# Patient Record
Sex: MALE | Race: WHITE
[De-identification: names, ages, dates, MRNs, and addresses within clinical notes are randomized per-mention and may not be internally consistent; named-entity substitution may affect disease eponyms.]

---

## 2021-12-17 ENCOUNTER — HOSPITAL ENCOUNTER (EMERGENCY)
Dept: HOSPITAL 75 - ER FS | Age: 46
Discharge: HOME | End: 2021-12-17
Payer: COMMERCIAL

## 2021-12-17 VITALS — SYSTOLIC BLOOD PRESSURE: 151 MMHG | DIASTOLIC BLOOD PRESSURE: 97 MMHG

## 2021-12-17 VITALS — HEIGHT: 70.98 IN | BODY MASS INDEX: 28.15 KG/M2 | WEIGHT: 201.06 LBS

## 2021-12-17 DIAGNOSIS — R42: Primary | ICD-10-CM

## 2021-12-17 DIAGNOSIS — F17.210: ICD-10-CM

## 2021-12-17 PROCEDURE — 82947 ASSAY GLUCOSE BLOOD QUANT: CPT

## 2021-12-17 NOTE — ED GENERAL
General


Chief Complaint:  General Problems/Pain


Stated Complaint:  DIZZY


Nursing Triage Note:  


Patient states that he was diagnosed with a right ear infection on 12/11.  


Patient was given prednison with followup with an ENT.  Patient has not had that




appointment yet.  Patient states that he began getting dizzy this afternoon.  


Patient states that he felt like he was going to pass out at times.  Patient 


wasn't given antibiotics for the ear infection, just prednisone and flonase.  


Patient wanted to get checked out.  Patient did state that he hasn't eaten 


today.


Source of Information:  Patient





History of Present Illness


Date Seen by Provider:  Dec 17, 2021


Time Seen by Provider:  20:09


Initial Comments


46-year-old male presenting with concern for continued dizziness over the last 4

to 5 weeks.  He states that it seems to be worse when he is turning his head 

quickly stands up.  At times he feels like he might pass out.  He felt like he 

might pass out this afternoon when he was standing up by the game.  He had been 

seen by the urgent care and they put him on steroids for an ear infection.  He 

went back and the have him on a nasal steroid spray as well as started him on 

meclizine.  He was to follow-up with Dr. Chacon with ENT but the first available 

appointment is not until March.  Since he was feeling bad this evening he had 

his girlfriend drive him here to the ED.  He is feeling anxious and jittery as 

well.  He states he drinks a lot of coffee and caffeine drinks.


Associated Systoms:  No Cough, No Diaphoresis, No Fever/Chills, No Headaches, No

Loss of Appetite, No Malaise, No Nausea/Vomiting, No Rash, No Seizure, No 

Shortness of Air, No Syncope, No Weakness





Allergies and Home Medications


Allergies


Coded Allergies:  


     No Known Drug Allergies (Unverified , 12/17/21)





Patient Home Medication List


Home Medication List Reviewed:  Yes





Review of Systems


Review of Systems


Constitutional:  see HPI


EENTM:  No ear discharge, No hearing loss, No ear pain, No blurred vision, No 

epistaxis, No nose congestion


Respiratory:  No cough


Cardiovascular:  chest pain (Tightness in his chest tonight as he feels anxious)


Gastrointestinal:  no symptoms reported


Genitourinary:  no symptoms reported


Musculoskeletal:  no symptoms reported


Skin:  no symptoms reported


Psychiatric/Neurological:  Anxiety; Denies Headache, Denies Numbness, Denies 

Paresthesia, Denies Tingling, Denies Tremors





Past Medical-Social-Family Hx


Patient Social History


Tobacco Use?:  Yes


Tobacco type used:  Cigarettes


Smoking Status:  Current Everyday Smoker


Substance use?:  No


Alcohol Use?:  No


Pt feels they are or have been:  No





Past Medical History


Surgery/Hospitalization HX:  


Ear Infection





Physical Exam


Vital Signs





Vital Signs - First Documented








 12/17/21





 20:11


 


Temp 36.3


 


Pulse 78


 


Resp 18


 


B/P (MAP) 151/97 (115)


 


Pulse Ox 96


 


O2 Delivery Room Air





Capillary Refill : Less Than 3 Seconds


Height, Weight, BMI


Height: '"


Weight: lbs. oz. kg; 28.00 BMI


Method:


General Appearance:  WD/WN, Anxious


HEENT:  PERRL/EOMI, TMs Normal, Normal ENT Inspection, Pharynx Normal


Neck:  Full Range of Motion, Normal Inspection, Non Tender, Supple


Respiratory:  Chest Non Tender, Lungs Clear, Normal Breath Sounds, No Accessory 

Muscle Use, No Respiratory Distress


Cardiovascular:  Regular Rate, Rhythm, No JVD, No Murmur, Normal Peripheral 

Pulses


Extremity:  Normal Capillary Refill, No Pedal Edema


Neurologic/Psychiatric:  Alert, Oriented x3, CNs II-XII Norm as Tested


Skin:  Warm/Dry





Progress/Results/Core Measures


Suspected Sepsis


SIRS


Temperature: 


Pulse: 78 


Respiratory Rate: 18


 


Blood Pressure 151 /97 


Mean: 115





Results/Orders


Lab Results





Laboratory Tests








Test


 12/17/21


20:16 Range/Units


 


 


Glucometer 80    MG/DL








Vital Signs/I&O











 12/17/21





 20:11


 


Temp 36.3


 


Pulse 78


 


Resp 18


 


B/P (MAP) 151/97 (115)


 


Pulse Ox 96


 


O2 Delivery Room Air





Capillary Refill : Less Than 3 Seconds








Blood Pressure Mean:                    115











Point of Care Testing


Finger Stick Blood Glucose:  80


Blood Glucose Action Taken:  Dr. Junior Notified


Progress Note :  


Progress Note


Accu-Chek was eighty.  Advised patient that his blood pressure did not appear 

high enough to be causing his dizziness.  I counseled him about having blood 

work to look for anemia as well as electrolyte imbalance or kidney or liver 

issues.  He could have an EKG to look for heart arrhythmias.  CT scan of the 

head to look for sinus infection, mass, tumor, stroke that might be causing his 

symptoms.


Patient refused and stated he did not feel those were necessary.  He will follow

up with the clinic and continue to work on trying to get in with ENT.  Counseled

he can return if having worsening symptoms or more concerns.





Departure


Impression





   Primary Impression:  


   Dizziness


Disposition:  01 HOME, SELF-CARE


Condition:  Stable





Departure-Patient Inst.


Decision time for Depature:  20:30


Referrals:  


CARMENCITA BHATIA MD





NO,LOCAL PHYSICIAN (PCP)


Primary Care Physician








[facility] – [place], [place]


Patient Instructions:  Dizziness, Adult ED, Vertigo ED





Add. Discharge Instructions:  


Try drinking more water and electrolyte drinks instead of just coffee and see if

that helps your symptoms.





You could continue taking the Meclizine to give it a chance and see if it helps 

with your dizziness. 





Establish care with local provider and work with them about your dizziness. You 

could call [facility] clinic at [phone] Monday morning and get set up with an 

appointment for establishing care. 





If you have worsening symptoms or more concerns before you are seen you could 

return to ER or seek medical care.





All discharge instructions reviewed with patient and/or family. Voiced 

understanding.











MEDARDO JUNIOR MD               Dec 17, 2021 20:32

## 2023-06-21 ENCOUNTER — HOSPITAL ENCOUNTER (EMERGENCY)
Dept: HOSPITAL 75 - ER FS | Age: 48
Discharge: HOME | End: 2023-06-21
Payer: COMMERCIAL

## 2023-06-21 VITALS — DIASTOLIC BLOOD PRESSURE: 70 MMHG | SYSTOLIC BLOOD PRESSURE: 134 MMHG

## 2023-06-21 VITALS — BODY MASS INDEX: 26.05 KG/M2 | WEIGHT: 186.07 LBS | HEIGHT: 70.87 IN

## 2023-06-21 DIAGNOSIS — F17.210: ICD-10-CM

## 2023-06-21 DIAGNOSIS — Z28.310: ICD-10-CM

## 2023-06-21 DIAGNOSIS — R07.89: Primary | ICD-10-CM

## 2023-06-21 DIAGNOSIS — T43.615A: ICD-10-CM

## 2023-06-21 LAB
ALBUMIN SERPL-MCNC: 4.5 GM/DL (ref 3.2–4.5)
ALP SERPL-CCNC: 134 U/L (ref 40–136)
ALT SERPL-CCNC: 46 U/L (ref 0–55)
APTT BLD: 26 SEC (ref 24–35)
BARBITURATES UR QL: NEGATIVE
BASOPHILS # BLD AUTO: 0.1 10^3/UL (ref 0–0.1)
BASOPHILS NFR BLD AUTO: 1 % (ref 0–10)
BENZODIAZ UR QL SCN: NEGATIVE
BILIRUB SERPL-MCNC: 0.2 MG/DL (ref 0.1–1)
BUN/CREAT SERPL: 8
CALCIUM SERPL-MCNC: 9.3 MG/DL (ref 8.5–10.1)
CHLORIDE SERPL-SCNC: 101 MMOL/L (ref 98–107)
CO2 SERPL-SCNC: 24 MMOL/L (ref 21–32)
COCAINE UR QL: NEGATIVE
CREAT SERPL-MCNC: 1.09 MG/DL (ref 0.6–1.3)
EOSINOPHIL # BLD AUTO: 0.2 10^3/UL (ref 0–0.3)
EOSINOPHIL NFR BLD AUTO: 2 % (ref 0–10)
GFR SERPLBLD BASED ON 1.73 SQ M-ARVRAT: 85 ML/MIN
GLUCOSE SERPL-MCNC: 97 MG/DL (ref 70–105)
HCT VFR BLD CALC: 45 % (ref 40–54)
HGB BLD-MCNC: 15.2 G/DL (ref 13.3–17.7)
INR PPP: 0.9 (ref 0.8–1.4)
LYMPHOCYTES # BLD AUTO: 2.9 10^3/UL (ref 1–4)
LYMPHOCYTES NFR BLD AUTO: 29 % (ref 12–44)
MAGNESIUM SERPL-MCNC: 1.9 MG/DL (ref 1.6–2.4)
MANUAL DIFFERENTIAL PERFORMED BLD QL: NO
MCH RBC QN AUTO: 31 PG (ref 25–34)
MCHC RBC AUTO-ENTMCNC: 34 G/DL (ref 32–36)
MCV RBC AUTO: 92 FL (ref 80–99)
METHADONE UR QL SCN: NEGATIVE
MONOCYTES # BLD AUTO: 0.8 10^3/UL (ref 0–1)
MONOCYTES NFR BLD AUTO: 8 % (ref 0–12)
NEUTROPHILS # BLD AUTO: 5.9 10^3/UL (ref 1.8–7.8)
NEUTROPHILS NFR BLD AUTO: 60 % (ref 42–75)
OPIATES UR QL SCN: NEGATIVE
OXYCODONE UR QL: NEGATIVE
PLATELET # BLD: 318 10^3/UL (ref 130–400)
PMV BLD AUTO: 9.8 FL (ref 9–12.2)
POTASSIUM SERPL-SCNC: 4.4 MMOL/L (ref 3.6–5)
PROPOXYPH UR QL: NEGATIVE
PROT SERPL-MCNC: 7.3 GM/DL (ref 6.4–8.2)
PROTHROMBIN TIME: 12.7 SEC (ref 12.2–14.7)
SODIUM SERPL-SCNC: 139 MMOL/L (ref 135–145)
TRICYCLICS UR QL SCN: NEGATIVE
WBC # BLD AUTO: 9.9 10^3/UL (ref 4.3–11)

## 2023-06-21 PROCEDURE — 83735 ASSAY OF MAGNESIUM: CPT

## 2023-06-21 PROCEDURE — 85610 PROTHROMBIN TIME: CPT

## 2023-06-21 PROCEDURE — 36415 COLL VENOUS BLD VENIPUNCTURE: CPT

## 2023-06-21 PROCEDURE — 84484 ASSAY OF TROPONIN QUANT: CPT

## 2023-06-21 PROCEDURE — 93041 RHYTHM ECG TRACING: CPT

## 2023-06-21 PROCEDURE — 85025 COMPLETE CBC W/AUTO DIFF WBC: CPT

## 2023-06-21 PROCEDURE — 83880 ASSAY OF NATRIURETIC PEPTIDE: CPT

## 2023-06-21 PROCEDURE — 71045 X-RAY EXAM CHEST 1 VIEW: CPT

## 2023-06-21 PROCEDURE — 80053 COMPREHEN METABOLIC PANEL: CPT

## 2023-06-21 PROCEDURE — 93005 ELECTROCARDIOGRAM TRACING: CPT

## 2023-06-21 PROCEDURE — 80306 DRUG TEST PRSMV INSTRMNT: CPT

## 2023-06-21 PROCEDURE — 85730 THROMBOPLASTIN TIME PARTIAL: CPT

## 2023-06-21 NOTE — ED CHEST PAIN
General


Stated Complaint:  CHEST PAIN





History of Present Illness


Date Seen by Provider:  2023


Time Seen by Provider:  14:09


Initial Comments


47-year-old male with no significant PMH, is here with complaints of left-sided 

chest pain for the past 3 days that comes and goes, and today morning the pain 

seems to be lasting longer.  Chest pain is none radiating, and rated as 2/ 10 at

rest and 6/10 upon movement.  Patient denies fever and chills, shortness of 

breath, palpitations, diaphoresis, abdominal pain, nausea and vomiting, heavy 

lifting, injuries.  Patient does not have any cardiac history.  Patient smokes 1

pack/day for the past 30 years.  Patient states that he drinks 20 cups of extra 

bold dark roast coffee every day.  Patient does not drink much water.





Allergies and Home Medications


Allergies


Coded Allergies:  


     No Known Drug Allergies (Unverified , 21)





Patient Home Medication List


Home Medication List Reviewed:  Yes





Review of Systems


Review of Systems


Constitutional:  no symptoms reported


EENTM:  No Symptoms Reported


Respiratory:  No Symptoms Reported


Cardiovascular:  See HPI, Chest Pain


Gastrointestinal:  No Symptoms Reported


Genitourinary:  No Symptoms Reported


Musculoskeletal:  no symptoms reported


Skin:  no symptoms reported


Psychiatric/Neurological:  No Symptoms Reported


Endocrine:  No Symptoms Reported


Hematologic/Lymphatic:  No Symptoms Reported





Past Medical-Social-Family Hx


Past Medical History


Surgery/Hospitalization HX:  


Ear Infection





Physical Exam


Vital Signs





Vital Signs - First Documented








 23





 14:05


 


Temp 35.3


 


Pulse 66


 


Resp 18


 


B/P (MAP) 158/98 (118)


 


O2 Delivery Room Air





Capillary Refill :


Height, Weight, BMI


Height: '"


Weight: lbs. oz. kg; 28.00 BMI


Method:


General Appearance:  No Apparent Distress, WD/WN


HEENT:  PERRL/EOMI


Neck:  Full Range of Motion, Normal Inspection


Respiratory:  Chest Non Tender, Lungs Clear, Normal Breath Sounds


Cardiovascular:  Regular Rate, Rhythm, No Edema, No Murmur


Gastrointestinal:  Normal Bowel Sounds, Non Tender, Soft


Extremity:  Normal Range of Motion


Neurologic/Psychiatric:  Alert, Oriented x3, No Motor/Sensory Deficits


Skin:  Normal Color





Progress/Results/Core Measures


Results/Orders


Lab Results





Laboratory Tests








Test


 23


14:09 23


14:34 Range/Units


 


 


White Blood Count


 9.9 


 


 4.3-11.0


10^3/uL


 


Red Blood Count


 4.85 


 


 4.30-5.52


10^6/uL


 


Hemoglobin 15.2   13.3-17.7  g/dL


 


Hematocrit 45   40-54  %


 


Mean Corpuscular Volume 92   80-99  fL


 


Mean Corpuscular Hemoglobin 31   25-34  pg


 


Mean Corpuscular Hemoglobin


Concent 34 


 


 32-36  g/dL





 


Red Cell Distribution Width 13.3   10.0-14.5  %


 


Platelet Count


 318 


 


 130-400


10^3/uL


 


Mean Platelet Volume 9.8   9.0-12.2  fL


 


Immature Granulocyte % (Auto) 0    %


 


Neutrophils (%) (Auto) 60   42-75  %


 


Lymphocytes (%) (Auto) 29   12-44  %


 


Monocytes (%) (Auto) 8   0-12  %


 


Eosinophils (%) (Auto) 2   0-10  %


 


Basophils (%) (Auto) 1   0-10  %


 


Neutrophils # (Auto)


 5.9 


 


 1.8-7.8


10^3/uL


 


Lymphocytes # (Auto)


 2.9 


 


 1.0-4.0


10^3/uL


 


Monocytes # (Auto)


 0.8 


 


 0.0-1.0


10^3/uL


 


Eosinophils # (Auto)


 0.2 


 


 0.0-0.3


10^3/uL


 


Basophils # (Auto)


 0.1 


 


 0.0-0.1


10^3/uL


 


Immature Granulocyte # (Auto)


 0.0 


 


 0.0-0.1


10^3/uL


 


Prothrombin Time 12.7   12.2-14.7  SEC


 


INR Comment 0.9   0.8-1.4  


 


Activated Partial


Thromboplast Time 26 


 


 24-35  SEC





 


Sodium Level 139   135-145  MMOL/L


 


Potassium Level 4.4   3.6-5.0  MMOL/L


 


Chloride Level 101     MMOL/L


 


Carbon Dioxide Level 24   21-32  MMOL/L


 


Anion Gap 14   5-14  MMOL/L


 


Blood Urea Nitrogen 9   7-18  MG/DL


 


Creatinine


 1.09 


 


 0.60-1.30


MG/DL


 


Estimat Glomerular Filtration


Rate 85 


 


  





 


BUN/Creatinine Ratio 8    


 


Glucose Level 97     MG/DL


 


Calcium Level 9.3   8.5-10.1  MG/DL


 


Corrected Calcium 8.9   8.5-10.1  MG/DL


 


Magnesium Level 1.9   1.6-2.4  MG/DL


 


Total Bilirubin 0.2   0.1-1.0  MG/DL


 


Aspartate Amino Transf


(AST/SGOT) 22 


 


 5-34  U/L





 


Alanine Aminotransferase


(ALT/SGPT) 46 


 


 0-55  U/L





 


Alkaline Phosphatase 134     U/L


 


Troponin I < 0.30   <0.30  NG/ML


 


Pro-B-Type Natriuretic Peptide < 36.0   <125.0  PG/ML


 


Total Protein 7.3   6.4-8.2  GM/DL


 


Albumin 4.5   3.2-4.5  GM/DL


 


Urine Opiates Screen  NEGATIVE  NEGATIVE  


 


Urine Oxycodone Screen  NEGATIVE  NEGATIVE  


 


Urine Methadone Screen  NEGATIVE  NEGATIVE  


 


Urine Propoxyphene Screen  NEGATIVE  NEGATIVE  


 


Urine Barbiturates Screen  NEGATIVE  NEGATIVE  


 


Ur Tricyclic Antidepressants


Screen 


 NEGATIVE 


 NEGATIVE  





 


Urine Phencyclidine Screen  NEGATIVE  NEGATIVE  


 


Urine Amphetamines Screen  NEGATIVE  NEGATIVE  


 


Urine Methamphetamines Screen  NEGATIVE  NEGATIVE  


 


Urine Benzodiazepines Screen  NEGATIVE  NEGATIVE  


 


Urine Cocaine Screen  NEGATIVE  NEGATIVE  


 


Urine Cannabinoids Screen  NEGATIVE  NEGATIVE  








My Orders





Orders - JEREMIE NORIEGA MD


Chest 1 View Ap/Pa Only (23 14:19)


Cbc With Automated Diff (23 14:19)


Magnesium (23 14:19)


Ekg Tracing (23 14:19)


Comprehensive Metabolic Panel (23 14:19)


Protime With Inr (23 14:19)


Partial Thromboplastin Time (23 14:19)


Monitor-Rhythm Ecg Trace Only (23 14:19)


Aspirin Chewable Tablet (Baby Aspirin Ch (23 14:30)


Ed Iv/Invasive Line Start (23 14:19)


Troponin I Fs (23 14:19)


Probnp Fs (23 14:19)


Drug Screen Stat (Urine) (23 14:19)


Antacid  Suspension (Mylanta  Suspension (23 15:45)


Pantoprazole Injection (Protonix Injecti (23 15:45)





Medications Given in ED





Current Medications








 Medications  Dose


 Ordered  Sig/Leann


 Route  Start Time


 Stop Time Status Last Admin


Dose Admin


 


 Aspirin  324 mg  ONCE  ONCE


 PO  23 14:30


 23 14:31 DC 23 14:32


324 MG








Vital Signs/I&O











 23





 14:05


 


Temp 35.3


 


Pulse 66


 


Resp 18


 


B/P (MAP) 158/98 (118)


 


O2 Delivery Room Air











Progress


Progress Note :  


Progress Note


1. EXCESSIVE CAFFEINE INDUCED CHEST PAIN, ACS RULED OUT:


- CXR: no acute findings


- CBC/ CMP: unremarkable


- UDS: negative


- EKG/ Troponin: non-ischemic


- ASA 324mg given 


- Protonix 40mg iv / Maalox suspension given in ER


- Advised pt to cut down on coffee and stop smoking.


- Follow up with Cardiology for stress test and cardiac screening. Call to make 

appointment.


-The patient was seen in the ED, and treated appropriately to presentation at a 

specific point in time. Patient is informed that there is a possibility that 

disease and illness can evolve and change in acuity rapidly or slowly after p

atient is discharged from the ER. Precautionary advice given to the patient for 

immediate return to ER if symptoms worsen or do not resolve, and to seek 

emergency care sooner rather than later. Pt also advised on the importance of 

PCP follow up and compliance with management and follow up plan with PCP and/or 

specialist, as this is part of the management plan. Pt verbally expressed 

understanding.


Initial ECG Impression Date:  2023


Initial ECG Impression Time:  14:12


Initial ECG Rhythm:  Normal Sinus


Initial ECG Intervals:  Normal


Initial ECG Impression:  Normal


Initial ECG Comparisson:  No Previous ECG Available





Diagnostic Imaging





   Diagonstic Imaging:  Xray


   Plain Films/CT/US/NM/MRI:  chest


Comments


                 [facility] [place], [place]





NAME:   GURDEEP BAEZ


MED REC#:   [id number]


ACCOUNT#:   [id number]


PT STATUS:   REG ER


:   1975


PHYSICIAN:   JEREMIE NORIEGA MD


ADMIT DATE:   23/ER FS


                                   ***Draft***


Date of Exam:23





CHEST 1 VIEW AP/PA ONLY








EXAMINATION: Chest 1 view





HISTORY: Chest pain.





COMPARISON: None available.





FINDINGS: 





The lung volumes are normal. No focal consolidation is seen. No


large pleural effusion or pneumothorax is seen. The


cardiomediastinal silhouette is normal in size and contour. No


acute osseous abnormality is seen.





IMPRESSION: 





1. No acute pleuroparenchymal process.





  Dictated on workstation # [id number]








Dict:   23 1456


Trans:   23 1457


[facility] [id number]





Interpreted by:     CLAY ZIMMER DO


Electronically signed by:





Departure


Impression





   Primary Impression:  


   Adverse effect of caffeine, initial encounter


   Additional Impressions:  


   Ruled out for myocardial infarction


   Chest pain


   Qualified Codes:  R07.9 - Chest pain, unspecified


Disposition:  01 HOME, SELF-CARE


Condition:  Stable





Departure-Patient Inst.


Referrals:  


LORA STARKS MD





NO,LOCAL PHYSICIAN (PCP)


Primary Care Physician


Patient Instructions:  Chest Pain, Adult ED, Heart Healthy Diet





Add. Discharge Instructions:  


- Advised pt to cut down on coffee and stop smoking.


- Follow up with Cardiology for stress test and cardiac screening. Call to make 

appointment.











JEREMIE NORIEGA MD              2023 14:08

## 2023-06-21 NOTE — DIAGNOSTIC IMAGING REPORT
EXAMINATION: Chest 1 view



HISTORY: Chest pain.



COMPARISON: None available.



FINDINGS: 



The lung volumes are normal. No focal consolidation is seen. No

large pleural effusion or pneumothorax is seen. The

cardiomediastinal silhouette is normal in size and contour. No

acute osseous abnormality is seen.



IMPRESSION: 



1. No acute pleuroparenchymal process.



Dictated by: 



  Dictated on workstation # [id number]